# Patient Record
Sex: MALE | Race: WHITE | NOT HISPANIC OR LATINO | Employment: STUDENT | URBAN - METROPOLITAN AREA
[De-identification: names, ages, dates, MRNs, and addresses within clinical notes are randomized per-mention and may not be internally consistent; named-entity substitution may affect disease eponyms.]

---
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
## 2023-09-11 NOTE — PROGRESS NOTES
"HPI: Piero Lugo is a  17 y.o. male who complains of bilateral foot pain. He rates his pain as 7/10 at times. He has seen Dr. Douglass and Dr. Avendaño and had conflicting opinions. The pain has been present for 3 months. 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 He had xrays and MRI done of both ankle which showed fibrous cortical defect bilaterally calcanei. R is 2.0 cm diamtere and left is 1.5 cm. MRI is not available for review today. He had injection of both ankle from Dr. Douglass which did not help the pain.       PAST MEDICAL/SURGICAL/FAMILY/SOCIAL/ HISTORY: REVIEWED    ALLERGIES/MEDICATIONS: REVIEWED       Review of Systems:     Constitution: Negative.   HEENT: Negative.   Eyes: Negative.   Cardiovascular: Negative.   Respiratory: Negative.   Endocrine: Negative.   Hematologic/Lymphatic: Negative.   Skin: Negative.   Musculoskeletal: Positive for bilateral ankle pain   Gastrointestinal: Negative.   Genitourinary: Negative.   Neurological: Negative.   Psychiatric/Behavioral: Negative.   Allergic/Immunologic: Negative.       PHYSICAL EXAM:  Vitals:    10/24/17 1440   BP: 125/63   Pulse: 68     Ht Readings from Last 1 Encounters:   10/24/17 5' 6" (1.676 m) (25 %, Z= -0.69)*     * Growth percentiles are based on CDC 2-20 Years data.     Wt Readings from Last 1 Encounters:   10/24/17 68 kg (150 lb) (75 %, Z= 0.69)*     * Growth percentiles are based on CDC 2-20 Years data.       GENERAL: Well developed, well nourished, no acute distress.  SKIN: Skin is intact. No atrophy, abrasions or lesions are noted.   Neurological: Normal mental status. Appropriate and conversant. Alert and oriented x 3.  GAIT: Walks with non-antalgic gait.    Left  lower extremity:  2+ dorsalis pedis pulse.  Capillary refill < 3 seconds.  Normal range of motion tibiotalar and subtalar joints. Normal alignment of the forefoot and the hindfoot.  5/5 strength EHL, FHL, tibialis anterior, gastrocsoleus, tibialis " posterior and peroneals. Sensation to light touch intact sural, saphenous, superficial peroneal and deep peroneal nerves. No swelling, ecchymosis or deformity. No lymphadenopathy, no masses or tumors palpated.  non-tender to palpation.     Right lower extremity:  2+ dorsalis pedis pulse.  Capillary refill < 3 seconds.  Normal range of motion tibiotalar and subtalar joints. Normal alignment of the forefoot and the hindfoot.  5/5 strength EHL, FHL, tibialis anterior, gastrocsoleus, tibialis posterior and peroneals. Sensation to light touch intact sural, saphenous, superficial peroneal and deep peroneal nerves. No swelling, ecchymosis or deformity. No lymphadenopathy, no masses or tumors palpated. non-tender to palpation.      XRAYS:   3 views of bilateral ankles  reviewed today reveal No evidence of fractures or dislocations. He does have bilateral cysts in the calcaneus c/w fibrous cortical defects. The right is about 2 cm and appears to communicate with the joint with very thin cortices. The left is smaller and does not show cortical thinning.       ASSESSMENT:        Encounter Diagnoses   Name Primary?    Fibrous cortical defect Yes    Solitary bone cyst, right ankle and foot     Solitary bone cyst, left ankle and foot           PLAN:  I spent 35 minutes in consulation with the patient today. More than half the time was spent counseling the patient and his parents on his condition and the options for operative versus non-operative care.    His grandfather has a h/o Rheumatoid Arthritis, so I ordered RF, MEL profile and HLA-B27. I discussed with them that I agree with Dr. Douglass and I think the cysts are incidental findings and the pain in the ankle is due to overuse from baseball. I also agree that the right is quite large and should undergo curettage and bone grafting to prevent pathologic fracture. They are going to f/u when ready for surgery.    normal/regular rate and rhythm/S1 S2 present/no gallops/no rub/no murmur